# Patient Record
(demographics unavailable — no encounter records)

---

## 2024-11-20 NOTE — HISTORY OF PRESENT ILLNESS
[FreeTextEntry1] : 63 year yo F PMH dm1 dx age 13 , slight neuropathy, HLD   presents for follow up of pancolitis, atrophic pancreas.   seen at the request of Dr. Lynda Edwards (endocrine/primary- [facility])   today, feels 100% back to normal. denies all complaints. GI PCR in 09/2024 negative. MRI ordered for atrophic pancreas- patient reports getting test done however there is no record of this test.  patient called to get prior cscope records but has not received anything to date.   Seen in ER on 8/19 for acute onset 4 days of nonbloody diarrhea 8-10 episodes per day. afebrile. no recent travel, abx, sick contacts.  normal CBC,   alk phos 124 otherwise normal.   CT A/P without oral or ivc pancolitis, atrophic pancreas atherosclerotic changes,   cdiff test and stool cx negative.   feels better but still not normal yet still gas, low abd pressure, slight left sided pain not.   weight is stable appetite is good but avoiding fiber currently nervous about eating.  last colonoscopy in 2023 in [facility]- minor polyps, told to repeat in 5 years. 3 lifetime colonoscopies- only polyps on the last one. no prior egd.   mat gm crc- dx late 70s.     Patient denies abdominal pain , n/v/heartburn, reflux, dysphagia/odynophagia, change in bowel habits, diarrhea, constipation, brbpr, melena. no weight loss. no jaundice/pruritus/fatigue.  Good appetite. Patient has daily BM, denies regular NSAID use.     Soc: no tobacco or significant EtOH   Family Hx: mat gm crc- dx late 70s.    ROS: constitutional: no weight loss, fevers ENT: no deafness Eyes: no blindness Neck: no lymphadenopathy Chest: no shortness of breath, no cough Cardiac: no chest pain, no palpitations Vascular: no leg swelling GI: as noted in hpi : no dysuria, dark urine Skin: no rashes, lesions, jaundice Heme: no bleeding Endocrine: no DM unless otherwise stated in HPI   Physical Exam: (VS noted below) General: alert, comfortable, in no acute distress Eyes: normal sclera, anicteric Neck: normal, supple, no neck mass Pulm: no respiratory distress, clear to auscultation bilaterally Heart: RRR, normal S1 S2 Abd: Soft, non-tender, non-distended, normal bowel sounds, no appreciable hepatosplenomegaly, no masses palpated Rectal: small prolapsing posterior int hemorrhoids, normal JOEY. small amount of brown stool . no tenderness Ext: warm and well perfused, no edema Skin: no rashes, no jaundice Neuro: alert, grossly nonfocal   Labs/imaging/prior endoscopic results were reviewed to the extent available and pertinent findings noted in HPI

## 2024-11-20 NOTE — ASSESSMENT
[FreeTextEntry1] : Pancolitis- likely infectious, now resolved.  -cont probiotic- culturelle GI PCR panel-negative   atrophic pancreas on CT- likely associated with Dm1. MRI ordered, will follow up with radiology dept for result.   CRC screening-prior records again requested.